# Patient Record
Sex: FEMALE | Race: WHITE | NOT HISPANIC OR LATINO | URBAN - METROPOLITAN AREA
[De-identification: names, ages, dates, MRNs, and addresses within clinical notes are randomized per-mention and may not be internally consistent; named-entity substitution may affect disease eponyms.]

---
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
## 2020-03-17 NOTE — ED PROVIDER NOTE - DISCUSSED CLINICAL AND RADIOLOGICAL FINDINGS WITH, MDM
S/w pt regarding rescheduling appt.  Advised pt she has been place on a contact list for rescheduling pts.  Verbalized understanding.   patient

## 2022-04-22 ENCOUNTER — EMERGENCY (EMERGENCY)
Facility: HOSPITAL | Age: 22
LOS: 0 days | Discharge: HOME | End: 2022-04-22
Attending: EMERGENCY MEDICINE | Admitting: EMERGENCY MEDICINE
Payer: MEDICAID

## 2022-04-22 VITALS
SYSTOLIC BLOOD PRESSURE: 138 MMHG | RESPIRATION RATE: 19 BRPM | HEIGHT: 62 IN | WEIGHT: 214.95 LBS | TEMPERATURE: 98 F | DIASTOLIC BLOOD PRESSURE: 72 MMHG | HEART RATE: 72 BPM | OXYGEN SATURATION: 99 %

## 2022-04-22 DIAGNOSIS — R04.0 EPISTAXIS: ICD-10-CM

## 2022-04-22 PROCEDURE — 99283 EMERGENCY DEPT VISIT LOW MDM: CPT

## 2022-04-22 NOTE — ED PROVIDER NOTE - PATIENT PORTAL LINK FT
You can access the FollowMyHealth Patient Portal offered by [facility] by registering at the following website: [email or web address]. By joining [facility]’s FollowMyHealth portal, you will also be able to view your health information using other applications (apps) compatible with our system.

## 2022-04-22 NOTE — ED PROVIDER NOTE - CARE PROVIDER_API CALL
COOKIE GALLAGHER  [place] [id number]  [place] [id number]  Phone: [phone]  Fax: [phone]  Follow Up Time:

## 2022-04-22 NOTE — ED ADULT NURSE NOTE - OBJECTIVE STATEMENT
22 year old female alert and oriented c.o of nose bleed that has now stopped, denies use to anticoags or trauma. No s.s of distress noted.

## 2022-04-22 NOTE — ED PROVIDER NOTE - OBJECTIVE STATEMENT
22y F no pmh presenting with left sided epistaxis that occurred for 30min and resolved prior to arrival. Atraumatic, no foreign body. No a/c. Epistaxis resolved pressure at the bridge. No f/c/n/v. No headache, chest pain, sob, cough/sore throat. No lightheadedness/dizziness.

## 2022-04-22 NOTE — ED PROVIDER NOTE - CLINICAL SUMMARY MEDICAL DECISION MAKING FREE TEXT BOX
22-year-old female presenting with spontaneous left nare epistaxis today.  Resolved by time of arrival.  No trauma.  No anticoagulation.  No other acute complaints.  Well appearing, NAD, non toxic. NCAT PERRLA EOMI neck supple dried blood to left nare, no active epistaxis no septal hematoma airway intact, no drooling, no stridor, no pooling of secretions, no posterior oropharyngeal erythema/edema/lesions. Speaking in full sentences. +tolerating secretions, tolerating PO.  Patient observed in ED with no rebleeding.  Comfortable with discharge and follow-up outpatient, strict return precautions given. Endorses understanding of all of this and aware that they can return at any time for new or concerning symptoms. No further questions or concerns at this time

## 2023-09-22 ENCOUNTER — EMERGENCY (EMERGENCY)
Facility: HOSPITAL | Age: 23
LOS: 0 days | Discharge: HOME | End: 2023-09-23
Attending: STUDENT IN AN ORGANIZED HEALTH CARE EDUCATION/TRAINING PROGRAM
Payer: COMMERCIAL
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
## 2023-09-22 DIAGNOSIS — M25.562 PAIN IN LEFT KNEE: ICD-10-CM
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
## 2023-09-22 DIAGNOSIS — J45.909 UNSPECIFIED ASTHMA, UNCOMPLICATED: ICD-10-CM

## 2023-09-22 DIAGNOSIS — M79.89 OTHER SPECIFIED SOFT TISSUE DISORDERS: ICD-10-CM

## 2023-09-22 PROCEDURE — 99283 EMERGENCY DEPT VISIT LOW MDM: CPT | Mod: 25

## 2023-09-22 PROCEDURE — 73564 X-RAY EXAM KNEE 4 OR MORE: CPT | Mod: LT

## 2023-09-22 PROCEDURE — 99284 EMERGENCY DEPT VISIT MOD MDM: CPT

## 2023-09-23 VITALS
DIASTOLIC BLOOD PRESSURE: 75 MMHG | SYSTOLIC BLOOD PRESSURE: 133 MMHG | RESPIRATION RATE: 18 BRPM | WEIGHT: 220.02 LBS | TEMPERATURE: 98 F | HEIGHT: 62 IN | OXYGEN SATURATION: 100 % | HEART RATE: 78 BPM

## 2023-09-23 PROCEDURE — 73564 X-RAY EXAM KNEE 4 OR MORE: CPT | Mod: 26,LT

## 2023-09-23 RX ORDER — IBUPROFEN 200 MG
600 TABLET ORAL ONCE
Refills: 0 | Status: COMPLETED | OUTPATIENT
Start: 2023-09-23 | End: 2023-09-23

## 2023-09-23 RX ORDER — ACETAMINOPHEN 500 MG
975 TABLET ORAL ONCE
Refills: 0 | Status: COMPLETED | OUTPATIENT
Start: 2023-09-23 | End: 2023-09-23

## 2023-09-23 RX ADMIN — Medication 975 MILLIGRAM(S): at 01:18

## 2023-09-23 RX ADMIN — Medication 600 MILLIGRAM(S): at 01:18

## 2023-09-23 NOTE — ED PROVIDER NOTE - OBJECTIVE STATEMENT
Patient is a 23y Female no pmhx presenting for evaluation of left knee pain s/p twisting it two nights ago while dancing. pt has been ambulatory since the event. Otherwise denies any fever, chills, headache, changes in vision, cough, congestion, cp, palpitations, sob, n/v/d, abd pain, constipation, urinary complaints.

## 2023-09-23 NOTE — ED PROVIDER NOTE - CLINICAL SUMMARY MEDICAL DECISION MAKING FREE TEXT BOX
Throughout ED observation period, pt remained clinically and hemodynamically stable.  xr w/o acute fracture  more likely ligamentous injury/strain  will have pt f/u w/ ortho and PT

## 2023-09-23 NOTE — ED ADULT NURSE NOTE - NSFALLUNIVINTERV_ED_ALL_ED
Bed/Stretcher in lowest position, wheels locked, appropriate side rails in place/Call bell, personal items and telephone in reach/Instruct patient to call for assistance before getting out of bed/chair/stretcher/Non-slip footwear applied when patient is off stretcher/[place] to call system/Physically safe environment - no spills, clutter or unnecessary equipment/Purposeful proactive rounding/Room/bathroom lighting operational, light cord in reach

## 2023-09-23 NOTE — ED PROVIDER NOTE - NSFOLLOWUPINSTRUCTIONS_ED_ALL_ED_FT
Use knee sleeve or ace wrap for better support  Follow up with physical therapy and orthopedics within 1 week  Take ibuprofen or tylenol for pain    Our Emergency Department Referral Coordinators will be reaching out to you in the next 24-48 hours from 9:00am to 5:00pm with a follow up appointment. Please expect a phone call from the hospital in that time frame. If you do not receive a call or if you have any questions or concerns, you can reach them at [phone].        Acute Knee Pain, Adult  Acute knee pain is sudden and may be caused by damage, swelling, or irritation of the muscles and tissues that support the knee. Pain may result from:  A fall.  An injury to the knee from twisting motions.  A hit to the knee.  Infection.  Acute knee pain may go away on its own with time and rest. If it does not, your health care provider may order tests to find the cause of the pain. These may include:  Imaging tests, such as an X-ray, MRI, CT scan, or ultrasound.  Joint aspiration. In this test, fluid is removed from the knee and evaluated.  Arthroscopy. In this test, a lighted tube is inserted into the knee and an image is projected onto a TV screen.  Biopsy. In this test, a sample of tissue is removed from the body and studied under a microscope.  Follow these instructions at home:  If you have a knee sleeve or brace:      Wear the knee sleeve or brace as told by your health care provider. Remove it only as told by your health care provider.  Loosen it if your toes tingle, become numb, or turn cold and blue.  Keep it clean.  If the knee sleeve or brace is not waterproof:  Do not let it get wet.  Cover it with a watertight covering when you take a bath or shower.  Activity    Rest your knee.  Do not do things that cause pain or make pain worse.  Avoid high-impact activities or exercises, such as running, jumping rope, or doing jumping jacks.  Work with a physical therapist to make a safe exercise program, as recommended by your health care provider. Do exercises as told by your physical therapist.  Managing pain, stiffness, and swelling      If directed, put ice on the affected knee. To do this:  If you have a removable knee sleeve or brace, remove it as told by your health care provider.  Put ice in a plastic bag.  Place a towel between your skin and the bag.  Leave the ice on for 20 minutes, 2–3 times a day.  Remove the ice if your skin turns bright red. This is very important. If you cannot feel pain, heat, or cold, you have a greater risk of damage to the area.  If directed, use an elastic bandage to put pressure (compression) on your injured knee. This may control swelling, give support, and help with discomfort.  Raise (elevate) your knee above the level of your heart while you are sitting or lying down.  Sleep with a pillow under your knee.  General instructions    Take over-the-counter and prescription medicines only as told by your health care provider.  Do not use any products that contain nicotine or tobacco, such as cigarettes, e-cigarettes, and chewing tobacco. If you need help quitting, ask your health care provider.  If you are overweight, work with your health care provider and a dietitian to set a weight-loss goal that is healthy and reasonable for you. Extra weight can put pressure on your knee.  Pay attention to any changes in your symptoms.  Keep all follow-up visits. This is important.  Contact a health care provider if:  Your knee pain continues, changes, or gets worse.  You have a fever along with knee pain.  Your knee feels warm to the touch or is red.  Your knee elise or locks up.  Get help right away if:  Your knee swells, and the swelling becomes worse.  You cannot move your knee.  You have severe pain in your knee that cannot be managed with pain medicine.  Summary  Acute knee pain can be caused by a fall, an injury, an infection, or damage, swelling, or irritation of the tissues that support your knee.  Your health care provider may perform tests to find out the cause of the pain.  Pay attention to any changes in your symptoms. Relieve your pain with rest, medicines, light activity, and the use of ice.  Get help right away if your knee swells, you cannot move your knee, or you have severe pain that cannot be managed with medicine.  This information is not intended to replace advice given to you by your health care provider. Make sure you discuss any questions you have with your health care provider.

## 2023-09-23 NOTE — ED PROVIDER NOTE - ATTENDING CONTRIBUTION TO CARE
22 yo f hx asthma  pt presents for eval of L knee pain.  pt was dancing Wednesday and may have felt a pop. pt has been ambulatory  but pain was worse after her shift today and she wanted to get checked out. no cp, sob, direct trauma.  pain above the knee and pt states the knee feels a little swollen.      vss  gen- NAD, aaox3  card-rrr  lungs-ctab, no wheezing or rhonchi  abd-sntnd, no guarding or rebound  neuro- full str/sensation, cn ii-xii grossly intact, normal coordination  msk- L knee w/ minimal swelling, FROM to knee, no laxity, pain worse w/ internal/external rotation of LE under pressure, no erythema

## 2023-09-23 NOTE — ED ADULT TRIAGE NOTE - BP NONINVASIVE DIASTOLIC (MM HG)
Special Test  2/24 Results/Comment   Meniscal Click    Crepitus    Flexion Test    Valgus Laxity    Varus Laxity    Lachmans    Drop Back    Homans negative           Reflexes/Sensation: 3/24   [x]Dermatomes/Myotomes intact    []Reflexes equal and normal bilaterally   []Other:    Joint mobility: 3/24    [x]Normal    []Hypo   []Hyper    Palpation: Denies TTP 3/24    Functional Mobility/Transfers: Independent; unable to participate in recreational and sporting activities  3/24    Posture: WNL 3/24    Bandages/Dressings/Incisions: Incisions well healed  3/24    Gait: (include devices/WB status) WNL  3/24                       [x] Patient history, allergies, meds reviewed. Medical chart reviewed. See intake form. 2/24    Review Of Systems (ROS):  [x]Performed Review of systems (Integumentary, CardioPulmonary, Neurological) by intake and observation. Intake form has been scanned into medical record. Patient has been instructed to contact their primary care physician regarding ROS issues if not already being addressed at this time.   2/24    RESTRICTIONS/PRECAUTIONS:  L LME 2/23/23    Exercises/Interventions:   Exercise/Equipment Resistance/Repetitions Other comments   Stretching     Hamstring 30 sec x 5 Start 2/24   Towel Pull 30 sec x 5 Start 2/24   Inclined Calf     Hip Flexion     ITB with strap 30 sec x 5 Start 3/28   ITB standing 30 sec x 5 Start 3/28   ITB cross over 30 sec x 5 Start 3/28   Groin     Quad                    SLR     SLR+ 3 x 30 sec Start 3/2        Isometrics     Quad sets 10 sec x 10 Start 2/24   Adduction sets 10 sec x 10 Start 2/24        Patellar Glides     Medial     Superior     Inferior          ROM     Sheet Pulls 10 sec x 10 Start 2/24   Hang Weights     Passive     Active     Weight Shift     Ankle Pumps 30x Start 2/24   Bike 8 min ^3/14             CKC     Calf raises 3 x 10; 25# ^3/24   Wall sits 30 sec x 3 Start 3/16   Lateral step downs 3 x 10 Start 3/16   Lateral band walks 3 75

## 2023-09-23 NOTE — ED PROVIDER NOTE - PATIENT PORTAL LINK FT
You can access the FollowMyHealth Patient Portal offered by [facility] by registering at the following website: [email or web address]. By joining [email or web address]’s FollowMyHealth portal, you will also be able to view your health information using other applications (apps) compatible with our system.